# Patient Record
Sex: FEMALE | Race: WHITE | Employment: FULL TIME | URBAN - NONMETROPOLITAN AREA
[De-identification: names, ages, dates, MRNs, and addresses within clinical notes are randomized per-mention and may not be internally consistent; named-entity substitution may affect disease eponyms.]

---

## 2022-12-26 ENCOUNTER — OFFICE VISIT (OUTPATIENT)
Dept: PRIMARY CARE CLINIC | Age: 42
End: 2022-12-26
Payer: COMMERCIAL

## 2022-12-26 VITALS
OXYGEN SATURATION: 99 % | DIASTOLIC BLOOD PRESSURE: 80 MMHG | HEIGHT: 66 IN | SYSTOLIC BLOOD PRESSURE: 118 MMHG | WEIGHT: 140 LBS | TEMPERATURE: 99.1 F | BODY MASS INDEX: 22.5 KG/M2 | HEART RATE: 83 BPM

## 2022-12-26 DIAGNOSIS — R21 RASH: Primary | ICD-10-CM

## 2022-12-26 PROCEDURE — 99203 OFFICE O/P NEW LOW 30 MIN: CPT | Performed by: FAMILY MEDICINE

## 2022-12-26 RX ORDER — RISANKIZUMAB-RZAA 150 MG/ML
INJECTION SUBCUTANEOUS
COMMUNITY
Start: 2022-10-31

## 2022-12-26 RX ORDER — METHYLPREDNISOLONE 4 MG/1
TABLET ORAL
Qty: 1 KIT | Refills: 0 | Status: SHIPPED | OUTPATIENT
Start: 2022-12-26 | End: 2023-01-01

## 2022-12-26 ASSESSMENT — PATIENT HEALTH QUESTIONNAIRE - PHQ9
SUM OF ALL RESPONSES TO PHQ QUESTIONS 1-9: 0
SUM OF ALL RESPONSES TO PHQ9 QUESTIONS 1 & 2: 0
SUM OF ALL RESPONSES TO PHQ QUESTIONS 1-9: 0
SUM OF ALL RESPONSES TO PHQ QUESTIONS 1-9: 0
2. FEELING DOWN, DEPRESSED OR HOPELESS: 0
SUM OF ALL RESPONSES TO PHQ QUESTIONS 1-9: 0
1. LITTLE INTEREST OR PLEASURE IN DOING THINGS: 0

## 2022-12-26 NOTE — PROGRESS NOTES
[facility]             [address], [address] [facility] [address]                        Telephone [phone]             Fax [phone]       Robert Coley  :  1980  Age:  36 y.o. MRN:  [id number]  Date of visit:  2022       Assessment and Plan:    Rash  Contact dermatitis vs. food allergy vs. other  A Medrol Dose Jamie was prescribed:  - methylPREDNISolone (MEDROL, JAMIE,) 4 MG tablet; Take by mouth as directed per instructions on dose pack. Take with food. Dispense: 1 kit; Refill: 0    Printed information regarding Rash was provided to the patient with her after visit summary. She was advised to follow up if symptoms worsen or do not resolve. Subjective:    Robert Coley is a 36 y.o. female who presents to [facility] today (2022) for evaluation of:  Skin Problem (Pt has an unknown rash on bilateral arms and shoulders,hips and legs. Pt has tried otc medications without success. Pt is very itchy and it is bruising from the scratching. Nothing new that the pt can think of. )      She reports a pruritic rash on her arms and legs for approximately a week. She denies any new soaps, lotions, laundry detergents, fabric softeners, etc.   She states that she bought a Caesar salad kit last week to take to a family gathering. This is the only this that is new or different that she has eaten recently. She has not taken any new medications recently. She does not take any prescription medications currently. Current medications are:  Current Outpatient Medications   Medication Sig Dispense Refill    SKYRIZI  MG/ML SOAJ        No current facility-administered medications for this visit. She has No Known Allergies. She  reports that she has never smoked.  She has never used smokeless tobacco.      Objective:    Vitals:    22 1522   BP: 118/80   Pulse: 83   Temp: 99.1 °F (37.3 °C)   TempSrc: Tympanic   SpO2: 99%   Weight: 140 lb (63.5 kg)   Height: 5' 6\" (1.676 m)     Body mass index is 22.6 kg/m². Well-nourished, well-developed female, healthy-appearing, alert, cooperative, and in no acute distress. There are raised mildly erythematous lesions on the arms bilaterally. There are excoriations surrounding the lesions.         (Please note that portions of this note were completed with a voice-recognition program. Efforts were made to edit the dictation but occasionally words are mis-transcribed.)